# Patient Record
Sex: MALE | Race: WHITE | NOT HISPANIC OR LATINO | Employment: FULL TIME | URBAN - METROPOLITAN AREA
[De-identification: names, ages, dates, MRNs, and addresses within clinical notes are randomized per-mention and may not be internally consistent; named-entity substitution may affect disease eponyms.]

---

## 2017-01-06 ENCOUNTER — HOSPITAL ENCOUNTER (OUTPATIENT)
Dept: RADIOLOGY | Facility: MEDICAL CENTER | Age: 62
End: 2017-01-06
Attending: ORTHOPAEDIC SURGERY
Payer: COMMERCIAL

## 2017-01-06 ENCOUNTER — OFFICE VISIT (OUTPATIENT)
Dept: URGENT CARE | Facility: PHYSICIAN GROUP | Age: 62
End: 2017-01-06
Payer: COMMERCIAL

## 2017-01-06 VITALS
HEART RATE: 78 BPM | OXYGEN SATURATION: 97 % | HEIGHT: 68 IN | BODY MASS INDEX: 31.07 KG/M2 | SYSTOLIC BLOOD PRESSURE: 144 MMHG | RESPIRATION RATE: 16 BRPM | DIASTOLIC BLOOD PRESSURE: 90 MMHG | WEIGHT: 205 LBS | TEMPERATURE: 98.2 F

## 2017-01-06 DIAGNOSIS — M79.662 PAIN AND SWELLING OF LEFT LOWER LEG: ICD-10-CM

## 2017-01-06 DIAGNOSIS — M79.662 PAIN OF LEFT CALF: ICD-10-CM

## 2017-01-06 DIAGNOSIS — I82.492 ACUTE DEEP VEIN THROMBOSIS (DVT) OF OTHER SPECIFIED VEIN OF LEFT LOWER EXTREMITY (HCC): ICD-10-CM

## 2017-01-06 DIAGNOSIS — M79.89 PAIN AND SWELLING OF LEFT LOWER LEG: ICD-10-CM

## 2017-01-06 PROCEDURE — 99214 OFFICE O/P EST MOD 30 MIN: CPT | Performed by: FAMILY MEDICINE

## 2017-01-06 PROCEDURE — 93971 EXTREMITY STUDY: CPT | Mod: LT

## 2017-01-06 ASSESSMENT — ENCOUNTER SYMPTOMS
FEVER: 0
WHEEZING: 0
COUGH: 0

## 2017-01-06 NOTE — MR AVS SNAPSHOT
"        Lam Kennedy Light   2017 3:45 PM   Office Visit   MRN: [id number]    Department:  [facility]   Dept Phone:  [phone]    Description:  Male : 1955   Provider:  Fareed Guajardo M.D.           Reason for Visit     Leg Pain L leg pain, pos for DVT      Allergies as of 2017     Allergen Noted Reactions    Pcn [Penicillins] 2011   Hives, Shortness of Breath, Swelling    Morphine 2012       Flushing, nausea and vomiting      You were diagnosed with     Acute deep vein thrombosis (DVT) of other specified vein of left lower extremity (HCC)   [0881459]         Vital Signs     Blood Pressure Pulse Temperature Respirations Height Weight    144/90 mmHg 78 36.8 °C (98.2 °F) 16 1.727 m (5' 7.99\") 92.987 kg (205 lb)    Body Mass Index Oxygen Saturation Smoking Status             31.18 kg/m2 97% Never Smoker          Basic Information     Date Of Birth Sex Race Ethnicity Preferred Language    1955 Male White Non- English      Your appointments     2017  9:15 AM   New Patient with [facility] EXAM 5   [facility]  (--)    [facility] [address]   [phone]              Problem List              ICD-10-CM Priority Class Noted - Resolved    Inguinal hernia, left K40.90   2012 - Present    Abdominal pain, left lower quadrant R10.32   2015 - Present    Complex tear of medial meniscus of left knee as current injury S83.232A   2016 - Present      Health Maintenance        Date Due Completion Dates    IMM DTaP/Tdap/Td Vaccine (1 - Tdap) 1975 ---    COLONOSCOPY 2006 ---    IMM INFLUENZA (1) 2016 ---    IMM ZOSTER VACCINE 2016 ---            Current Immunizations     Tuberculin Skin Test 2016      Below and/or attached are the medications your provider expects you to take. Review all of your home medications and newly ordered medications with your provider and/or " pharmacist. Follow medication instructions as directed by your provider and/or pharmacist. Please keep your medication list with you and share with your provider. Update the information when medications are discontinued, doses are changed, or new medications (including over-the-counter products) are added; and carry medication information at all times in the event of emergency situations     Allergies:  PCN - Hives,Shortness of Breath,Swelling     MORPHINE - (reactions not documented)               Medications  Valid as of: January 06, 2017 -  4:22 PM    Generic Name Brand Name Tablet Size Instructions for use    .                 Medicines prescribed today were sent to:     [facility]  [place], [place] - [address] AT [facility] [place]    [address] [place] [address]    Phone: [phone] Fax: [phone]    Open 24 Hours?: No      Medication refill instructions:       If your prescription bottle indicates you have medication refills left, it is not necessary to call your provider’s office. Please contact your pharmacy and they will refill your medication.    If your prescription bottle indicates you do not have any refills left, you may request refills at any time through one of the following ways: The online [employer] system (except Urgent Care), by calling your provider’s office, or by asking your pharmacy to contact your provider’s office with a refill request. Medication refills are processed only during regular business hours and may not be available until the next business day. Your provider may request additional information or to have a follow-up visit with you prior to refilling your medication.   *Please Note: Medication refills are assigned a new Rx number when refilled electronically. Your pharmacy may indicate that no refills were authorized even though a new prescription for the same medication is available at the pharmacy. Please request the medicine by name with the  pharmacy before contacting your provider for a refill.        Your To Do List     Future Labs/Procedures Complete By Expires    BASIC METABOLIC PANEL  As directed 1/6/2018         MyChart Status: Patient Declined

## 2017-01-07 NOTE — PROGRESS NOTES
"Subjective:      Lam Wells is a 61 y.o. male who presents with Leg Pain            HPI  Checking in to urgent care with ultrasound +left leg DVT. S/p knee arthroscopic surgery 12/29/2016. No CP. No SOB. No hemoptysis. No PMH/FH DVT/PE/thrombophilia. No PMH kidney disease. No PMH GI bleed. He does note easy bruising of the skin on his arms. Pain is moderate posterior ache.     Review of Systems   Constitutional: Negative for fever.   Respiratory: Negative for cough and wheezing.    Skin: Negative for itching and rash.   .  Medications, Allergies, and current problem list reviewed today in Epic         Objective:     /90 mmHg  Pulse 78  Temp(Src) 36.8 °C (98.2 °F)  Resp 16  Ht 1.727 m (5' 7.99\")  Wt 92.987 kg (205 lb)  BMI 31.18 kg/m2  SpO2 97%     Physical Exam   Constitutional: He appears well-developed and well-nourished. No distress.   Neck: Neck supple. No JVD present.   Cardiovascular: Normal rate, regular rhythm and normal heart sounds.    Pulmonary/Chest: Effort normal and breath sounds normal. He has no wheezes.   Musculoskeletal:   Left calf: +swelling, diameter >3cm compared to right. Tender posterior aspect. No cords.      Neurological:   Speech is clear. Patient is appropriate and cooperative.     Skin: Skin is warm and dry. No rash noted.               Assessment/Plan:     1. Acute deep vein thrombosis (DVT) of other specified vein of left lower extremity (HCC)  BASIC METABOLIC PANEL      risk vs benefit of medication discussed at length   F/u anticoagulation clinic 1/12.     "

## 2017-01-10 ENCOUNTER — HOSPITAL ENCOUNTER (OUTPATIENT)
Dept: LAB | Facility: MEDICAL CENTER | Age: 62
End: 2017-01-10
Attending: FAMILY MEDICINE
Payer: COMMERCIAL

## 2017-01-10 ENCOUNTER — HOSPITAL ENCOUNTER (OUTPATIENT)
Dept: PHYSICAL THERAPY | Facility: REHABILITATION | Age: 62
End: 2017-01-10
Attending: ORTHOPAEDIC SURGERY
Payer: COMMERCIAL

## 2017-01-10 DIAGNOSIS — I82.492 ACUTE DEEP VEIN THROMBOSIS (DVT) OF OTHER SPECIFIED VEIN OF LEFT LOWER EXTREMITY (HCC): ICD-10-CM

## 2017-01-10 LAB
ANION GAP SERPL CALC-SCNC: 9 MMOL/L (ref 0–11.9)
BUN SERPL-MCNC: 18 MG/DL (ref 8–22)
CALCIUM SERPL-MCNC: 9.5 MG/DL (ref 8.5–10.5)
CHLORIDE SERPL-SCNC: 103 MMOL/L (ref 96–112)
CO2 SERPL-SCNC: 25 MMOL/L (ref 20–33)
CREAT SERPL-MCNC: 0.95 MG/DL (ref 0.5–1.4)
GLUCOSE SERPL-MCNC: 137 MG/DL (ref 65–99)
POTASSIUM SERPL-SCNC: 4.1 MMOL/L (ref 3.6–5.5)
SODIUM SERPL-SCNC: 137 MMOL/L (ref 135–145)

## 2017-01-10 PROCEDURE — 97110 THERAPEUTIC EXERCISES: CPT

## 2017-01-10 PROCEDURE — 97161 PT EVAL LOW COMPLEX 20 MIN: CPT

## 2017-01-10 PROCEDURE — 36415 COLL VENOUS BLD VENIPUNCTURE: CPT

## 2017-01-10 PROCEDURE — 80048 BASIC METABOLIC PNL TOTAL CA: CPT

## 2017-01-10 PROCEDURE — 97014 ELECTRIC STIMULATION THERAPY: CPT

## 2017-01-12 ENCOUNTER — ANTICOAGULATION VISIT (OUTPATIENT)
Dept: VASCULAR LAB | Facility: MEDICAL CENTER | Age: 62
End: 2017-01-12
Attending: NURSE PRACTITIONER
Payer: COMMERCIAL

## 2017-01-12 DIAGNOSIS — I82.499 DEEP VEIN THROMBOSIS (DVT) OF OTHER VEIN OF LOWER EXTREMITY, UNSPECIFIED CHRONICITY, UNSPECIFIED LATERALITY (HCC): ICD-10-CM

## 2017-01-12 PROBLEM — I82.409 DEEP VEIN THROMBOSIS (HCC): Status: ACTIVE | Noted: 2017-01-12

## 2017-01-12 LAB — INR PPP: 1.6 (ref 2–3.5)

## 2017-01-12 PROCEDURE — 85610 PROTHROMBIN TIME: CPT

## 2017-01-12 PROCEDURE — 99213 OFFICE O/P EST LOW 20 MIN: CPT

## 2017-01-12 NOTE — PROGRESS NOTES
Anticoagulation Summary as of 1/12/2017     INR goal    Selected INR 1.6 (1/12/2017)   Maintenance plan No maintenance plan   Plan last modified Horacio Vega, PHARMD (1/12/2017)   Next INR check    Target end date     Indications   Deep vein thrombosis (HCC) [I82.409] [I82.409]         Anticoagulation Episode Summary     INR check location [facility]    Preferred lab     Send INR reminders to     Comments Xarelto       Anticoagulation Care Providers     Provider Role Specialty Phone number    [facility] Responsible  [phone]        Anticoagulation Patient Findings    Past Medical History   Diagnosis Date   • Dental disorder 2015     full upper & partial lower   • Elevated cholesterol        No current outpatient prescriptions on file prior to visit.     No current facility-administered medications on file prior to visit.       Lab Results   Component Value Date/Time    SODIUM 137 01/10/2017 07:52 AM    POTASSIUM 4.1 01/10/2017 07:52 AM    CHLORIDE 103 01/10/2017 07:52 AM    CO2 25 01/10/2017 07:52 AM    GLUCOSE 137* 01/10/2017 07:52 AM    BUN 18 01/10/2017 07:52 AM    CREATININE 0.95 01/10/2017 07:52 AM          Lam Wells referred to [facility] for new DVT s/p knee surgery, he has never had a DVT before and will need 3-6 month of therapy or as appropriate per Dr. Bloch.  There is thrombus noted within the left gastrocnemius, posterior tibial and peroneal veins. The thrombus extends to within 1.4 cm of the popliteal vein. He was started on Xarelto 15mg bid for 3 weeks and then will start 20mg once daily.  We will see him in about 4 weeks with labs to make sure he is tolerating the medication.     Pt tolerating medication well, no s/s of bleeding.     Med rec done with pt (see above)    Pt education done regarding Xarelto     Follow up appointment in 4 week(s) with CBC and CMP     Horacio Vega, PHARMD

## 2017-01-12 NOTE — MR AVS SNAPSHOT
Lam Kennedy Russell   2017 9:15 AM   Anticoagulation Visit   MRN: [id number]    Department:  Vascular Medicine   Dept Phone:  [phone]    Description:  Male : 1955   Provider:  [facility] EXAM 5           Allergies as of 2017     Allergen Noted Reactions    Pcn [Penicillins] 2011   Hives, Shortness of Breath, Swelling    Morphine 2012       Flushing, nausea and vomiting      You were diagnosed with     Deep vein thrombosis (DVT) of other vein of lower extremity, unspecified chronicity, unspecified laterality (HCC)   [[id number]]         Vital Signs     Smoking Status                   Never Smoker            Basic Information     Date Of Birth Sex Race Ethnicity Preferred Language    1955 Male White Non- English      Your appointments     2017  8:00 AM   PT Follow Up 30 Minutes with Octaviano Zhang P.T.   [facility] [address]    [address]  [address]  [facility] [address]   [phone]            2017  8:30 AM   PT Follow Up 30 Minutes with Lisa Luciano P.T.   [facility] [address]    [address]  [facility] [address]   [phone]            2017  7:30 AM   Established Patient with [facility] EXAM 4   [facility]  (--)    [address] [facility] [address]   [phone]              Problem List              ICD-10-CM Priority Class Noted - Resolved    Inguinal hernia, left K40.90   2012 - Present    Abdominal pain, left lower quadrant R10.32   2015 - Present    Complex tear of medial meniscus of left knee as current injury S83.232A   2016 - Present    Deep vein thrombosis (HCC) [I82.409] I82.409   2017 - Present      Health Maintenance        Date Due Completion Dates    IMM DTaP/Tdap/Td Vaccine (1 - Tdap) 1975 ---    COLONOSCOPY 2006 ---    IMM INFLUENZA (1) 2016 ---    IMM  ZOSTER VACCINE 12/7/2016 ---            Current Immunizations     Tuberculin Skin Test 2/8/2016      Below and/or attached are the medications your provider expects you to take. Review all of your home medications and newly ordered medications with your provider and/or pharmacist. Follow medication instructions as directed by your provider and/or pharmacist. Please keep your medication list with you and share with your provider. Update the information when medications are discontinued, doses are changed, or new medications (including over-the-counter products) are added; and carry medication information at all times in the event of emergency situations     Allergies:  PCN - Hives,Shortness of Breath,Swelling     MORPHINE - (reactions not documented)               Medications  Valid as of: January 12, 2017 -  9:39 AM    Generic Name Brand Name Tablet Size Instructions for use    Rivaroxaban (Tab) XARELTO 20 MG Take 1 Tab by mouth with dinner.        .                 Medicines prescribed today were sent to:     [facility] [address] [place] [address], [place] - [address] AT [facility] [place] [address] [address] [place] [address]    Phone: [phone] Fax: [phone]    Open 24 Hours?: No      Medication refill instructions:       If your prescription bottle indicates you have medication refills left, it is not necessary to call your provider’s office. Please contact your pharmacy and they will refill your medication.    If your prescription bottle indicates you do not have any refills left, you may request refills at any time through one of the following ways: The online [employer] system (except Urgent Care), by calling your provider’s office, or by asking your pharmacy to contact your provider’s office with a refill request. Medication refills are processed only during regular business hours and may not be available until the next business day. Your provider may request additional information or to have a  follow-up visit with you prior to refilling your medication.   *Please Note: Medication refills are assigned a new Rx number when refilled electronically. Your pharmacy may indicate that no refills were authorized even though a new prescription for the same medication is available at the pharmacy. Please request the medicine by name with the pharmacy before contacting your provider for a refill.        Your To Do List     Future Labs/Procedures Complete By Expires    COMP METABOLIC PANEL  As directed 1/12/2018      Referral     A referral request has been sent to our patient care coordination department. Please allow 3-5 business days for us to process this request and contact you either by phone or mail. If you do not hear from us by the 5th business day, please call us at [phone].           MyChart Status: Patient Declined

## 2017-01-13 ENCOUNTER — APPOINTMENT (OUTPATIENT)
Dept: PHYSICAL THERAPY | Facility: REHABILITATION | Age: 62
End: 2017-01-13
Attending: ORTHOPAEDIC SURGERY
Payer: COMMERCIAL

## 2017-01-13 LAB — INR BLD: 1.6 (ref 0.9–1.2)

## 2017-01-16 ENCOUNTER — TELEPHONE (OUTPATIENT)
Dept: VASCULAR LAB | Facility: MEDICAL CENTER | Age: 62
End: 2017-01-16

## 2017-01-17 ENCOUNTER — APPOINTMENT (OUTPATIENT)
Dept: PHYSICAL THERAPY | Facility: REHABILITATION | Age: 62
End: 2017-01-17
Attending: ORTHOPAEDIC SURGERY
Payer: COMMERCIAL

## 2017-01-17 ENCOUNTER — ANTICOAGULATION MONITORING (OUTPATIENT)
Dept: VASCULAR LAB | Facility: MEDICAL CENTER | Age: 62
End: 2017-01-17

## 2017-01-17 NOTE — TELEPHONE ENCOUNTER
Initial anticoag note reviewed.  Unless pcp has other recommendations, per ACCP guidelines  we will conitnue with 3 months of oral anticoagulation followed by asa and close surveillance for below knee dvt occuring after knee surgery.    Michael Bloch, MD  Anticoagulation Clinic    Cc:  JOHNNY Hercules

## 2017-01-19 ENCOUNTER — APPOINTMENT (OUTPATIENT)
Dept: PHYSICAL THERAPY | Facility: REHABILITATION | Age: 62
End: 2017-01-19
Attending: ORTHOPAEDIC SURGERY
Payer: COMMERCIAL

## 2017-01-24 ENCOUNTER — APPOINTMENT (OUTPATIENT)
Dept: PHYSICAL THERAPY | Facility: REHABILITATION | Age: 62
End: 2017-01-24
Attending: ORTHOPAEDIC SURGERY
Payer: COMMERCIAL

## 2017-01-26 ENCOUNTER — APPOINTMENT (OUTPATIENT)
Dept: PHYSICAL THERAPY | Facility: REHABILITATION | Age: 62
End: 2017-01-26
Attending: ORTHOPAEDIC SURGERY
Payer: COMMERCIAL

## 2017-01-31 ENCOUNTER — APPOINTMENT (OUTPATIENT)
Dept: PHYSICAL THERAPY | Facility: REHABILITATION | Age: 62
End: 2017-01-31
Attending: ORTHOPAEDIC SURGERY
Payer: COMMERCIAL

## 2017-02-02 ENCOUNTER — APPOINTMENT (OUTPATIENT)
Dept: PHYSICAL THERAPY | Facility: REHABILITATION | Age: 62
End: 2017-02-02
Attending: ORTHOPAEDIC SURGERY
Payer: COMMERCIAL

## 2017-02-09 ENCOUNTER — ANTICOAGULATION VISIT (OUTPATIENT)
Dept: VASCULAR LAB | Facility: MEDICAL CENTER | Age: 62
End: 2017-02-09
Attending: NURSE PRACTITIONER
Payer: COMMERCIAL

## 2017-02-09 DIAGNOSIS — I82.402 ACUTE DEEP VEIN THROMBOSIS (DVT) OF LEFT LOWER EXTREMITY, UNSPECIFIED VEIN (HCC): ICD-10-CM

## 2017-02-09 LAB — INR PPP: 1.3 (ref 2–3.5)

## 2017-02-09 PROCEDURE — 99212 OFFICE O/P EST SF 10 MIN: CPT

## 2017-02-09 PROCEDURE — 85610 PROTHROMBIN TIME: CPT

## 2017-02-09 NOTE — MR AVS SNAPSHOT
Lam Kennedy Russell   2017 7:30 AM   Anticoagulation Visit   MRN: [id number]    Department:  Vascular Medicine   Dept Phone:  [phone]    Description:  Male : 1955   Provider:  [facility] EXAM 5           Allergies as of 2017     Allergen Noted Reactions    Pcn [Penicillins] 2011   Hives, Shortness of Breath, Swelling    Morphine 2012       Flushing, nausea and vomiting      Vital Signs     Smoking Status                   Never Smoker            Basic Information     Date Of Birth Sex Race Ethnicity Preferred Language    1955 Male White Non- English      Your appointments     2017  9:00 AM   Established Patient with [facility] EXAM 4   [facility] [place] [facility]  (--)    [address] [facility] [address]   [phone]              Problem List              ICD-10-CM Priority Class Noted - Resolved    Inguinal hernia, left K40.90   2012 - Present    Abdominal pain, left lower quadrant R10.32   2015 - Present    Complex tear of medial meniscus of left knee as current injury S83.232A   2016 - Present    Deep vein thrombosis (HCC) [I82.409] I82.409   2017 - Present      Health Maintenance        Date Due Completion Dates    IMM DTaP/Tdap/Td Vaccine (1 - Tdap) 1975 ---    COLONOSCOPY 2006 ---    IMM INFLUENZA (1) 2016 ---    IMM ZOSTER VACCINE 2016 ---            Results     POCT Protime      Component    INR    1.3                        Current Immunizations     Tuberculin Skin Test 2016      Below and/or attached are the medications your provider expects you to take. Review all of your home medications and newly ordered medications with your provider and/or pharmacist. Follow medication instructions as directed by your provider and/or pharmacist. Please keep your medication list with you and share with your provider. Update the information when medications are discontinued, doses are  changed, or new medications (including over-the-counter products) are added; and carry medication information at all times in the event of emergency situations     Allergies:  PCN - Hives,Shortness of Breath,Swelling     MORPHINE - (reactions not documented)               Medications  Valid as of: February 09, 2017 -  7:41 AM    Generic Name Brand Name Tablet Size Instructions for use    Rivaroxaban (Tab) XARELTO 20 MG Take 1 Tab by mouth with dinner.        .                 Medicines prescribed today were sent to:     [facility] [address] [place] [address] [place], [place] - [address] [facility]    [address] [place] [address]    Phone: [phone] Fax: [phone]    Open 24 Hours?: No      Medication refill instructions:       If your prescription bottle indicates you have medication refills left, it is not necessary to call your provider’s office. Please contact your pharmacy and they will refill your medication.    If your prescription bottle indicates you do not have any refills left, you may request refills at any time through one of the following ways: The online [employer] system (except Urgent Care), by calling your provider’s office, or by asking your pharmacy to contact your provider’s office with a refill request. Medication refills are processed only during regular business hours and may not be available until the next business day. Your provider may request additional information or to have a follow-up visit with you prior to refilling your medication.   *Please Note: Medication refills are assigned a new Rx number when refilled electronically. Your pharmacy may indicate that no refills were authorized even though a new prescription for the same medication is available at the pharmacy. Please request the medicine by name with the pharmacy before contacting your provider for a refill.        Warfarin Dosing Calendar   February 2017 Details    Sun Mon Tue Wed Thu Fri Sat        1                 2               3               4                 5               6               7               8               9   1.3      See details      10               11                 12               13               14               15               16               17               18                 19               20               21               22               23               24               25                 26               27               28                    Date Details   02/09 This INR check   INR: 1.3      Date of next INR: No date specified              MyChart Status: Patient Declined

## 2017-02-09 NOTE — PROGRESS NOTES
Target end date:4/12/17    Health Status Since Last Assessment   Patient denies any new relevant medical problems, ED visits or hospitalizations   Patient denies any embolic events (stroke/tia/systemic embolism)    Adherence with DOAC Therapy   Pt has not missed any doses in the average week    Bleeding Risk Assessment     Denies Epistaxis   Pt denies any excessive or unusual bleeding/hematomas.  Pt denies any GI bleeds or hematemesis.  Pt denies any concerning daily headache or sub dural hematoma symptoms.     Pt denies any hematuria or abnormal vaginal bleeding.   Latest Hemoglobin 16.5   ETOH overuse Denies     Creatinine Clearance/Renal Function     Latest ClCr >50 mL/min      Drug Interactions   ASA/other antiplatelets Denies   NSAID Denies   Other drug interactions Denies    Examination   Significant gait impairment/imbalance/high risk for falls? No significant risks   Pt successfully transitioned to Xarelto 20 mg once daily.    States he still has swelling in his leg.   Refuses to wear compression stocking.   States he can afford the medication.     Final Assessment and Recommendations:   Patient appears stable from the anticoagulation staindpoint.     Benefits of continued DOAC therapy outweigh risks for this patient   Recommend pt continue with current DOAC therapy.     Other Actions:    Follow up:   Will follow up with patient in 2 months prior to discontinuing medication.   Dr Bloch has suggested 3 months total followed by daily ASA with careful monitoring unless PCP suggests otherwise.     Jeancarlos Handley, PHARMD

## 2017-02-14 LAB — INR BLD: 1.3 (ref 0.9–1.2)

## 2017-04-12 ENCOUNTER — ANTICOAGULATION VISIT (OUTPATIENT)
Dept: VASCULAR LAB | Facility: MEDICAL CENTER | Age: 62
End: 2017-04-12
Attending: INTERNAL MEDICINE
Payer: COMMERCIAL

## 2017-04-12 DIAGNOSIS — I82.402 ACUTE DEEP VEIN THROMBOSIS (DVT) OF LEFT LOWER EXTREMITY, UNSPECIFIED VEIN (HCC): ICD-10-CM

## 2017-04-12 LAB
INR BLD: 1 (ref 0.9–1.2)
INR PPP: 1 (ref 2–3.5)

## 2017-04-12 PROCEDURE — 85610 PROTHROMBIN TIME: CPT

## 2017-04-12 PROCEDURE — 99211 OFF/OP EST MAY X REQ PHY/QHP: CPT | Performed by: PHARMACIST

## 2017-04-12 NOTE — MR AVS SNAPSHOT
Lam Kennedy Russell   2017 9:00 AM   Anticoagulation Visit   MRN: [id number]    Department:  Vascular Medicine   Dept Phone:  [phone]    Description:  Male : 1955   Provider:  [facility] EXAM 4           Allergies as of 2017     Allergen Noted Reactions    Pcn [Penicillins] 2011   Hives, Shortness of Breath, Swelling    Morphine 2012       Flushing, nausea and vomiting      You were diagnosed with     Acute deep vein thrombosis (DVT) of left lower extremity, unspecified vein (CMS-HCC)   [[id number]]         Vital Signs     Smoking Status                   Never Smoker            Basic Information     Date Of Birth Sex Race Ethnicity Preferred Language    1955 Male White Non- English      Your appointments     2017  9:00 AM   Established Patient with [facility] EXAM 4   [facility] [place] [facility]  (--)    [address] [facility] [address]   [phone]              Problem List              ICD-10-CM Priority Class Noted - Resolved    Inguinal hernia, left K40.90   2012 - Present    Abdominal pain, left lower quadrant R10.32   2015 - Present    Complex tear of medial meniscus of left knee as current injury S83.232A   2016 - Present    Deep vein thrombosis (HCC) [I82.409] I82.409   2017 - Present      Health Maintenance        Date Due Completion Dates    IMM DTaP/Tdap/Td Vaccine (1 - Tdap) 1975 ---    COLONOSCOPY 2006 ---    IMM ZOSTER VACCINE 2016 ---            Results     POCT Protime      Component    INR    1.0                        Current Immunizations     Tuberculin Skin Test 2016      Below and/or attached are the medications your provider expects you to take. Review all of your home medications and newly ordered medications with your provider and/or pharmacist. Follow medication instructions as directed by your provider and/or pharmacist. Please keep your medication list with you  and share with your provider. Update the information when medications are discontinued, doses are changed, or new medications (including over-the-counter products) are added; and carry medication information at all times in the event of emergency situations     Allergies:  PCN - Hives,Shortness of Breath,Swelling     MORPHINE - (reactions not documented)               Medications  Valid as of: April 12, 2017 -  8:58 AM    Generic Name Brand Name Tablet Size Instructions for use    Rivaroxaban (Tab) XARELTO 20 MG Take 1 Tab by mouth with dinner.        .                 Medicines prescribed today were sent to:     [facility] [address] [place], [place] [address], [place] - [address] AT [facility] [place]    [address] [place] [address]    Phone: [phone] Fax: [phone]    Open 24 Hours?: No      Medication refill instructions:       If your prescription bottle indicates you have medication refills left, it is not necessary to call your provider’s office. Please contact your pharmacy and they will refill your medication.    If your prescription bottle indicates you do not have any refills left, you may request refills at any time through one of the following ways: The online [employer] system (except Urgent Care), by calling your provider’s office, or by asking your pharmacy to contact your provider’s office with a refill request. Medication refills are processed only during regular business hours and may not be available until the next business day. Your provider may request additional information or to have a follow-up visit with you prior to refilling your medication.   *Please Note: Medication refills are assigned a new Rx number when refilled electronically. Your pharmacy may indicate that no refills were authorized even though a new prescription for the same medication is available at the pharmacy. Please request the medicine by name with the pharmacy before contacting your provider for a refill.           Warfarin Dosing Calendar   April 2017 Details    Sun Mon Tue Wed Thu Fri Sat           1                 2               3               4               5               6               7               8                 9               10               11               12   1.0      See details      13               14               15                 16               17               18               19               20               21               22                 23               24               25               26               27               28               29                 30                      Date Details   04/12 This INR check   INR: 1.0      Date of next INR: No date specified              MyChart Status: Patient Declined

## 2017-04-12 NOTE — PROGRESS NOTES
Anticoagulation Summary as of 4/12/2017     INR goal    Selected INR 1.0 (4/12/2017)   Maintenance plan No maintenance plan   Plan last modified Horacio Vega, PHARMD (1/12/2017)   Next INR check    Target end date 4/12/2017    Indications   Deep vein thrombosis (HCC) [I82.409] [I82.409]         Anticoagulation Episode Summary     INR check location [facility]    Preferred lab     Send INR reminders to     Comments Xarelto       Anticoagulation Care Providers     Provider Role Specialty Phone number    [facility] Responsible  [phone]        Anticoagulation Patient Findings    Patient seen in clinic today for follow up on Xarelto therapy.  He is tolerating medication quite well, no AE's or bleeding/bruising issues to report.  He had visit with PCP and decision on EOT was deferred to our clinic.  Patient has completed three months of anticoagulation after provoked event.    Health Status Since Last Assessment   Patient denies any new relevant medical problems, ED visits or hospitalizations   Patient denies any embolic events (stroke/tia/systemic embolism)    Adherence with DOAC Therapy   Pt has NO missed any doses in the average week    Bleeding Risk Assessment     Negative Epistaxis   Pt denies any excessive or unusual bleeding/hematomas.  Pt denies any GI bleeds or hematemesis.  Pt denies any concerning daily headache or sub dural hematoma symptoms.     Pt denies any hematuria   ETOH overuse Negative     Creatinine Clearance/Renal Function     Latest ClCr >50 mL/min     Drug Interactions   ASA/other antiplatelets Negative   NSAID Negative   Other drug interactions Negative      Final Assessment and Recommendations:   Per ACC guidelines and Dr. Bloch recommendation, patient may discontinue anticoagulation with Xarelto starting tomorrow, 4-13-17, and switch to once daily ASA 81mg.  We discussed at length close surveillance and monitoring for signs and symptoms of VTE going forward and  to seek emergency care.  Patient acknowledged understanding.   At this point, will discharge from Anticoagulation Clinic pending further contact from patient or PCP.    Javed Weinstein, PHARMD    CC Dr. Michael Bloch

## 2017-04-21 ENCOUNTER — ANTICOAGULATION MONITORING (OUTPATIENT)
Dept: VASCULAR LAB | Facility: MEDICAL CENTER | Age: 62
End: 2017-04-21

## 2017-04-21 DIAGNOSIS — I82.402 ACUTE DEEP VEIN THROMBOSIS (DVT) OF LEFT LOWER EXTREMITY, UNSPECIFIED VEIN (HCC): ICD-10-CM

## 2017-08-18 ENCOUNTER — HOSPITAL ENCOUNTER (OUTPATIENT)
Dept: RADIOLOGY | Facility: MEDICAL CENTER | Age: 62
End: 2017-08-18
Attending: NURSE PRACTITIONER
Payer: COMMERCIAL

## 2017-08-18 DIAGNOSIS — R60.9 EDEMA, UNSPECIFIED TYPE: ICD-10-CM

## 2017-08-18 PROCEDURE — 93971 EXTREMITY STUDY: CPT | Mod: LT

## 2018-01-19 DIAGNOSIS — Z86.718 HISTORY OF DVT (DEEP VEIN THROMBOSIS): ICD-10-CM

## 2018-10-27 ENCOUNTER — HOSPITAL ENCOUNTER (OUTPATIENT)
Dept: LAB | Facility: MEDICAL CENTER | Age: 63
End: 2018-10-27
Attending: NURSE PRACTITIONER
Payer: COMMERCIAL

## 2018-10-27 LAB
25(OH)D3 SERPL-MCNC: 15 NG/ML (ref 30–100)
ALBUMIN SERPL BCP-MCNC: 4.1 G/DL (ref 3.2–4.9)
ALBUMIN/GLOB SERPL: 1.5 G/DL
ALP SERPL-CCNC: 54 U/L (ref 30–99)
ALT SERPL-CCNC: 40 U/L (ref 2–50)
ANION GAP SERPL CALC-SCNC: 7 MMOL/L (ref 0–11.9)
AST SERPL-CCNC: 25 U/L (ref 12–45)
BASOPHILS # BLD AUTO: 1 % (ref 0–1.8)
BASOPHILS # BLD: 0.06 K/UL (ref 0–0.12)
BILIRUB SERPL-MCNC: 0.8 MG/DL (ref 0.1–1.5)
BUN SERPL-MCNC: 16 MG/DL (ref 8–22)
CALCIUM SERPL-MCNC: 9.5 MG/DL (ref 8.5–10.5)
CHLORIDE SERPL-SCNC: 110 MMOL/L (ref 96–112)
CHOLEST SERPL-MCNC: 214 MG/DL (ref 100–199)
CO2 SERPL-SCNC: 24 MMOL/L (ref 20–33)
CREAT SERPL-MCNC: 0.84 MG/DL (ref 0.5–1.4)
EOSINOPHIL # BLD AUTO: 0.21 K/UL (ref 0–0.51)
EOSINOPHIL NFR BLD: 3.4 % (ref 0–6.9)
ERYTHROCYTE [DISTWIDTH] IN BLOOD BY AUTOMATED COUNT: 41.6 FL (ref 35.9–50)
EST. AVERAGE GLUCOSE BLD GHB EST-MCNC: 131 MG/DL
FASTING STATUS PATIENT QL REPORTED: NORMAL
GLOBULIN SER CALC-MCNC: 2.8 G/DL (ref 1.9–3.5)
GLUCOSE SERPL-MCNC: 108 MG/DL (ref 65–99)
HBA1C MFR BLD: 6.2 % (ref 0–5.6)
HCT VFR BLD AUTO: 49.8 % (ref 42–52)
HDLC SERPL-MCNC: 36 MG/DL
HGB BLD-MCNC: 17.1 G/DL (ref 14–18)
IMM GRANULOCYTES # BLD AUTO: 0.05 K/UL (ref 0–0.11)
IMM GRANULOCYTES NFR BLD AUTO: 0.8 % (ref 0–0.9)
LDLC SERPL CALC-MCNC: 156 MG/DL
LYMPHOCYTES # BLD AUTO: 1.97 K/UL (ref 1–4.8)
LYMPHOCYTES NFR BLD: 31.9 % (ref 22–41)
MCH RBC QN AUTO: 31.3 PG (ref 27–33)
MCHC RBC AUTO-ENTMCNC: 34.3 G/DL (ref 33.7–35.3)
MCV RBC AUTO: 91 FL (ref 81.4–97.8)
MONOCYTES # BLD AUTO: 0.53 K/UL (ref 0–0.85)
MONOCYTES NFR BLD AUTO: 8.6 % (ref 0–13.4)
NEUTROPHILS # BLD AUTO: 3.35 K/UL (ref 1.82–7.42)
NEUTROPHILS NFR BLD: 54.3 % (ref 44–72)
NRBC # BLD AUTO: 0 K/UL
NRBC BLD-RTO: 0 /100 WBC
PLATELET # BLD AUTO: 247 K/UL (ref 164–446)
PMV BLD AUTO: 10.1 FL (ref 9–12.9)
POTASSIUM SERPL-SCNC: 4 MMOL/L (ref 3.6–5.5)
PROT SERPL-MCNC: 6.9 G/DL (ref 6–8.2)
PSA SERPL-MCNC: 1.37 NG/ML (ref 0–4)
RBC # BLD AUTO: 5.47 M/UL (ref 4.7–6.1)
SODIUM SERPL-SCNC: 141 MMOL/L (ref 135–145)
T4 FREE SERPL-MCNC: 0.95 NG/DL (ref 0.53–1.43)
TRIGL SERPL-MCNC: 109 MG/DL (ref 0–149)
TSH SERPL DL<=0.005 MIU/L-ACNC: 1.17 UIU/ML (ref 0.38–5.33)
WBC # BLD AUTO: 6.2 K/UL (ref 4.8–10.8)

## 2018-10-27 PROCEDURE — 80053 COMPREHEN METABOLIC PANEL: CPT

## 2018-10-27 PROCEDURE — 84270 ASSAY OF SEX HORMONE GLOBUL: CPT

## 2018-10-27 PROCEDURE — 83036 HEMOGLOBIN GLYCOSYLATED A1C: CPT

## 2018-10-27 PROCEDURE — 84443 ASSAY THYROID STIM HORMONE: CPT

## 2018-10-27 PROCEDURE — 84403 ASSAY OF TOTAL TESTOSTERONE: CPT

## 2018-10-27 PROCEDURE — 84153 ASSAY OF PSA TOTAL: CPT

## 2018-10-27 PROCEDURE — 36415 COLL VENOUS BLD VENIPUNCTURE: CPT

## 2018-10-27 PROCEDURE — 84439 ASSAY OF FREE THYROXINE: CPT

## 2018-10-27 PROCEDURE — 80061 LIPID PANEL: CPT

## 2018-10-27 PROCEDURE — 85025 COMPLETE CBC W/AUTO DIFF WBC: CPT

## 2018-10-27 PROCEDURE — 82306 VITAMIN D 25 HYDROXY: CPT

## 2018-10-29 LAB
SHBG SERPL-SCNC: 29 NMOL/L (ref 11–80)
TESTOST FREE MFR SERPL: 1.9 % (ref 1.6–2.9)
TESTOST FREE SERPL-MCNC: 51 PG/ML (ref 47–244)
TESTOST SERPL-MCNC: 270 NG/DL (ref 300–720)

## 2024-08-27 ENCOUNTER — APPOINTMENT (OUTPATIENT)
Dept: RADIOLOGY | Facility: MEDICAL CENTER | Age: 69
End: 2024-08-27
Attending: GENERAL PRACTICE
Payer: COMMERCIAL

## 2024-09-27 ENCOUNTER — HOSPITAL ENCOUNTER (OUTPATIENT)
Dept: RADIOLOGY | Facility: MEDICAL CENTER | Age: 69
End: 2024-09-27
Attending: GENERAL PRACTICE
Payer: COMMERCIAL

## 2024-09-27 DIAGNOSIS — I87.2 PERIPHERAL VENOUS INSUFFICIENCY: ICD-10-CM

## 2024-09-27 DIAGNOSIS — I82.409 DEEP PHLEBOTHROMBOSIS, ANTEPARTUM, WITH DELIVERY (HCC): ICD-10-CM

## 2024-09-27 DIAGNOSIS — I82.403 ACUTE EMBOLISM AND THROMBOSIS OF DEEP VEIN OF BOTH LOWER EXTREMITIES (HCC): ICD-10-CM

## 2024-09-27 PROCEDURE — 93970 EXTREMITY STUDY: CPT | Mod: 26 | Performed by: INTERNAL MEDICINE

## 2024-09-27 PROCEDURE — 93970 EXTREMITY STUDY: CPT
